# Patient Record
Sex: MALE | Race: WHITE
[De-identification: names, ages, dates, MRNs, and addresses within clinical notes are randomized per-mention and may not be internally consistent; named-entity substitution may affect disease eponyms.]

---

## 2019-07-26 ENCOUNTER — HOSPITAL ENCOUNTER (EMERGENCY)
Dept: HOSPITAL 91 - E/R | Age: 79
Discharge: ANOTHER HOSPITAL | End: 2019-07-26
Payer: COMMERCIAL

## 2019-07-26 ENCOUNTER — HOSPITAL ENCOUNTER (EMERGENCY)
Dept: HOSPITAL 10 - E/R | Age: 79
Discharge: ANOTHER HOSPITAL | End: 2019-07-26
Payer: COMMERCIAL

## 2019-07-26 VITALS
HEIGHT: 62 IN | BODY MASS INDEX: 28.11 KG/M2 | WEIGHT: 152.78 LBS | HEIGHT: 62 IN | WEIGHT: 152.78 LBS | BODY MASS INDEX: 28.11 KG/M2

## 2019-07-26 VITALS — DIASTOLIC BLOOD PRESSURE: 108 MMHG | SYSTOLIC BLOOD PRESSURE: 164 MMHG | HEART RATE: 95 BPM | RESPIRATION RATE: 18 BRPM

## 2019-07-26 DIAGNOSIS — I10: Primary | ICD-10-CM

## 2019-07-26 LAB
ADD MAN DIFF?: NO
ANION GAP: 13 (ref 5–13)
BASOPHIL #: 0 10^3/UL (ref 0–0.1)
BASOPHILS %: 0.4 % (ref 0–2)
BLOOD UREA NITROGEN: 11 MG/DL (ref 7–20)
CALCIUM: 9.7 MG/DL (ref 8.4–10.2)
CARBON DIOXIDE: 22 MMOL/L (ref 21–31)
CHLORIDE: 104 MMOL/L (ref 97–110)
CREATININE: 1.05 MG/DL (ref 0.61–1.24)
EOSINOPHILS #: 0 10^3/UL (ref 0–0.5)
EOSINOPHILS %: 0.2 % (ref 0–7)
GLUCOSE: 128 MG/DL (ref 70–220)
HEMATOCRIT: 45.1 % (ref 42–52)
HEMOGLOBIN: 14.9 G/DL (ref 14–18)
IMMATURE GRANS #M: 0.09 10^3/UL (ref 0–0.03)
IMMATURE GRANS % (M): 0.9 % (ref 0–0.43)
LYMPHOCYTES #: 1.8 10^3/UL (ref 0.8–2.9)
LYMPHOCYTES %: 18.4 % (ref 15–51)
MEAN CORPUSCULAR HEMOGLOBIN: 28.9 PG (ref 29–33)
MEAN CORPUSCULAR HGB CONC: 33 G/DL (ref 32–37)
MEAN CORPUSCULAR VOLUME: 87.6 FL (ref 82–101)
MEAN PLATELET VOLUME: 10.4 FL (ref 7.4–10.4)
MONOCYTE #: 0.7 10^3/UL (ref 0.3–0.9)
MONOCYTES %: 7 % (ref 0–11)
NEUTROPHIL #: 7.2 10^3/UL (ref 1.6–7.5)
NEUTROPHILS %: 73.1 % (ref 39–77)
NUCLEATED RED BLOOD CELLS #: 0 10^3/UL (ref 0–0)
NUCLEATED RED BLOOD CELLS%: 0 /100WBC (ref 0–0)
PLATELET COUNT: 235 10^3/UL (ref 140–415)
POTASSIUM: 3.9 MMOL/L (ref 3.5–5.1)
RED BLOOD COUNT: 5.15 10^6/UL (ref 4.7–6.1)
RED CELL DISTRIBUTION WIDTH: 13.6 % (ref 11.5–14.5)
SODIUM: 139 MMOL/L (ref 135–144)
TROPONIN-I: 0.66 NG/ML (ref 0–0.12)
WHITE BLOOD COUNT: 9.8 10^3/UL (ref 4.8–10.8)

## 2019-07-26 PROCEDURE — 71045 X-RAY EXAM CHEST 1 VIEW: CPT

## 2019-07-26 PROCEDURE — 96374 THER/PROPH/DIAG INJ IV PUSH: CPT

## 2019-07-26 PROCEDURE — 99285 EMERGENCY DEPT VISIT HI MDM: CPT

## 2019-07-26 PROCEDURE — 80048 BASIC METABOLIC PNL TOTAL CA: CPT

## 2019-07-26 PROCEDURE — 84484 ASSAY OF TROPONIN QUANT: CPT

## 2019-07-26 PROCEDURE — 36415 COLL VENOUS BLD VENIPUNCTURE: CPT

## 2019-07-26 PROCEDURE — 85025 COMPLETE CBC W/AUTO DIFF WBC: CPT

## 2019-07-26 PROCEDURE — 93005 ELECTROCARDIOGRAM TRACING: CPT

## 2019-07-26 RX ADMIN — HEPARIN SODIUM AND DEXTROSE 1 MLS/HR: 10000; 5 INJECTION INTRAVENOUS at 17:30

## 2019-07-26 RX ADMIN — ASPIRIN 325 MG ORAL TABLET 1 MG: 325 PILL ORAL at 16:44

## 2019-07-26 RX ADMIN — HEPARIN SODIUM 1 UNIT: 1000 INJECTION, SOLUTION INTRAVENOUS; SUBCUTANEOUS at 17:23

## 2019-07-26 RX ADMIN — NITROGLYCERIN 1 TAB: 0.4 TABLET, ORALLY DISINTEGRATING SUBLINGUAL at 16:57

## 2019-07-26 RX ADMIN — CLOPIDOGREL BISULFATE 1 MG: 300 TABLET, FILM COATED ORAL at 17:20

## 2019-07-26 NOTE — ERD
ER Documentation


Chief Complaint


Chief Complaint





chest pain that radiates to left arm x 3 days





HPI


Patient with no known past medical history for 3 days patient has been having 


intermittent chest pressure.  No shortness of breath no exertional component.  


No headache or blurry vision.  Has had multiple episodes of vomiting.  No abd


ominal pain no recent travel antibiotics.  No drugs or alcohol.  Patient denies 


taking any medications or being told he has high blood pressure.





ROS


All systems reviewed and are negative except as per history of present illness.





Medications


Home Meds


No Active Prescriptions or Reported Meds





Allergies


Allergies:  


Coded Allergies:  


     No Known Allergy (Unverified , 7/26/19)





PMhx/Soc


Medical and Surgical Hx:  pt denies Medical Hx, pt denies Surgical Hx


History of Surgery:  No


Anesthesia Reaction:  No


Hx Neurological Disorder:  No


Hx Respiratory Disorders:  No


Hx Cardiac Disorders:  No


Hx Psychiatric Problems:  No


Hx Miscellaneous Medical Probl:  No


Hx Alcohol Use:  No


Hx Substance Use:  No


Hx Tobacco Use:  No


Smoking Status:  Never smoker





Physical Exam


Vitals





Vital Signs


  Date      Temp  Pulse  Resp  B/P (MAP)   Pulse Ox  O2          O2 Flow    FiO2


Time                                                 Delivery    Rate


   7/26/19  98.2     94    16     212/124        99


     15:00                          (153)





Physical Exam


Const:   No acute distress


Head:   Atraumatic 


Eyes:    Normal Conjunctiva


ENT:    Normal External Ears, Nose and Mouth.


Neck:               Full range of motion. No meningismus.


Resp:   Clear to auscultation bilaterally


Cardio:   Regular rate and rhythm, no murmurs


Abd:    Soft, non tender, non distended. Normal bowel sounds


Skin:   No petechiae or rashes


Back:   No midline or flank tenderness


Ext:    No cyanosis, or edema


Neur:   Awake and alert


Psych:    Normal Mood and Affect


Result Diagram:  


7/26/19 1531                                                                    


           7/26/19 1531





Results 24 hrs





Laboratory Tests


              Test
                                 7/26/19
15:31


              White Blood Count                      9.8 10^3/ul


              Red Blood Count                       5.15 10^6/ul


              Hemoglobin                               14.9 g/dl


              Hematocrit                                  45.1 %


              Mean Corpuscular Volume                    87.6 fl


              Mean Corpuscular Hemoglobin                28.9 pg


              Mean Corpuscular Hemoglobin
Concent     33.0 g/dl 



              Red Cell Distribution Width                 13.6 %


              Platelet Count                         235 10^3/UL


              Mean Platelet Volume                       10.4 fl


              Immature Granulocytes %                    0.900 %


              Neutrophils %                               73.1 %


              Lymphocytes %                               18.4 %


              Monocytes %                                  7.0 %


              Eosinophils %                                0.2 %


              Basophils %                                  0.4 %


              Nucleated Red Blood Cells %            0.0 /100WBC


              Immature Granulocytes #              0.090 10^3/ul


              Neutrophils #                          7.2 10^3/ul


              Lymphocytes #                          1.8 10^3/ul


              Monocytes #                            0.7 10^3/ul


              Eosinophils #                          0.0 10^3/ul


              Basophils #                            0.0 10^3/ul


              Nucleated Red Blood Cells #            0.0 10^3/ul


              Sodium Level                            139 mmol/L


              Potassium Level                         3.9 mmol/L


              Chloride Level                          104 mmol/L


              Carbon Dioxide Level                     22 mmol/L


              Anion Gap                                       13


              Blood Urea Nitrogen                       11 mg/dl


              Creatinine                              1.05 mg/dl


              Est Glomerular Filtrat Rate
mL/min    mL/min 



              Glucose Level                            128 mg/dl


              Calcium Level                            9.7 mg/dl


              Troponin I                             0.656 ng/ml





Current Medications


 Medications
   Dose
          Sig/Dinah
       Start Time
   Status  Last


 (Trade)       Ordered        Route
 PRN     Stop Time              Admin
Dose


                              Reason                                Admin


 Aspirin
       325 mg         ONCE  STAT
    7/26/19       DC           7/26/19


(Aspirin)                     PO
            16:21
                       16:44



                                             7/26/19 16:23


                1 tab          Q5M UP TO 3    7/26/19 7/26/19


Nitroglycerin                 DOSES PRN
     16:30
                       16:57




                             SL
 .CHEST


(Nitroglyceri                 PAIN


n
  (Sl Tab)


0.4 Mg)


 Aspirin
       325 mg         ONCE  STAT
    7/26/19       DC       



(Aspirin)                     PO
            17:11



                                             7/26/19 17:14


 Clopidogrel
   300 mg         ONCE  STAT
    7/26/19       DC           7/26/19


Bisulfate
                    PO
            17:11
                       17:20



(plaVIX)                                     7/26/19 17:13


 Heparin        4,000 unit     ONCE  STAT
    7/26/19       DC       



Sodium
                       IV
            17:11



(Porcine)
                                   7/26/19 17:13


(Heparin


(1000



Units/ml))


 Heparin        250 ml @ 0
    ONCE  STAT
    7/26/19       DC       



Sodium
        mls/hr         IV
            17:11



(Porcine)                                    7/26/19 17:13








Procedures/MDM


Patient presented with chest pain for 3days presenting also with hypertension.  


Will get troponin labs.  Aspirin given.  Will give nitro for chest pain and high


blood pressure.  Initial EKG with no STEMI however borderline elevations of ST 


segments in V3 V4 no reciprocal ST depressions second EKG with dynamic changes 


with now V2 V3 borderline elevations again no ST depressions chest pain has 


improved with nitro.  Troponin elevated.  Will give heparin Plavix and aspirin 


and consult cardiology for possible transfer





ECG


Time: 1449


Ventricular Rate: 93


Rhythm: normal sinus rhythm. 


ST Segments: without evidence of depressions  V2 and V4 borderline elevation but


concave up.  And no reciprocal ST depressions


Intervals: without evidence of AV block, new BBB, long QT, Brugada


No evidence of delta wave. 





ECG


Time: 1643


Ventricular Rate: 85


Rhythm: normal sinus rhythm. 


ST Segments: without evidence of depressions borderline elevation V2 V3


Intervals: without evidence of AV block, new BBB, long QT, Brugada


No evidence of delta wave. 





Progress note


Time: 1732


Recent vitals: Heart rate 92 blood pressure 164/108


Update: Chest pain-free heparin bolus Plavix aspirin given [facility] 


patient will transfer patient accepting doctor is VINAY Thompson MD           Jul 26, 2019 16:24